# Patient Record
Sex: MALE | Race: WHITE | Employment: FULL TIME | URBAN - METROPOLITAN AREA
[De-identification: names, ages, dates, MRNs, and addresses within clinical notes are randomized per-mention and may not be internally consistent; named-entity substitution may affect disease eponyms.]

---

## 2022-11-07 ENCOUNTER — APPOINTMENT (OUTPATIENT)
Dept: GENERAL RADIOLOGY | Age: 57
End: 2022-11-07
Payer: COMMERCIAL

## 2022-11-07 ENCOUNTER — HOSPITAL ENCOUNTER (EMERGENCY)
Age: 57
Discharge: HOME | End: 2022-11-07
Attending: EMERGENCY MEDICINE
Payer: COMMERCIAL

## 2022-11-07 ENCOUNTER — APPOINTMENT (OUTPATIENT)
Dept: CT IMAGING | Age: 57
End: 2022-11-07
Payer: COMMERCIAL

## 2022-11-07 VITALS
TEMPERATURE: 98.7 F | WEIGHT: 280 LBS | DIASTOLIC BLOOD PRESSURE: 82 MMHG | BODY MASS INDEX: 41.47 KG/M2 | SYSTOLIC BLOOD PRESSURE: 132 MMHG | RESPIRATION RATE: 16 BRPM | HEART RATE: 64 BPM | OXYGEN SATURATION: 99 % | HEIGHT: 69 IN

## 2022-11-07 DIAGNOSIS — S09.90XA INJURY OF HEAD, INITIAL ENCOUNTER: ICD-10-CM
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
## 2022-11-07 DIAGNOSIS — S49.92XA INJURY OF LEFT SHOULDER, INITIAL ENCOUNTER: ICD-10-CM

## 2022-11-07 PROCEDURE — 70450 CT HEAD/BRAIN W/O DYE: CPT

## 2022-11-07 PROCEDURE — 71046 X-RAY EXAM CHEST 2 VIEWS: CPT

## 2022-11-07 PROCEDURE — 99284 EMERGENCY DEPT VISIT MOD MDM: CPT

## 2022-11-07 PROCEDURE — 73030 X-RAY EXAM OF SHOULDER: CPT

## 2022-11-07 RX ORDER — IBUPROFEN 800 MG/1
800 TABLET ORAL ONCE
Status: DISCONTINUED | OUTPATIENT
Start: 2022-11-07 | End: 2022-11-07 | Stop reason: HOSPADM

## 2022-11-07 ASSESSMENT — ENCOUNTER SYMPTOMS
DIARRHEA: 0
BACK PAIN: 1
CHEST TIGHTNESS: 0
EYE ITCHING: 0
NAUSEA: 0
CONSTIPATION: 0
RHINORRHEA: 0
SHORTNESS OF BREATH: 0
VOMITING: 0
FACIAL SWELLING: 1
ABDOMINAL DISTENTION: 0
EYE REDNESS: 0
WHEEZING: 0
TROUBLE SWALLOWING: 0
ABDOMINAL PAIN: 0

## 2022-11-07 ASSESSMENT — PAIN DESCRIPTION - DESCRIPTORS: DESCRIPTORS: ACHING

## 2022-11-07 ASSESSMENT — PAIN DESCRIPTION - LOCATION
LOCATION: HEAD;SHOULDER
LOCATION: SHOULDER;HEAD;BACK

## 2022-11-07 ASSESSMENT — PAIN SCALES - GENERAL
PAINLEVEL_OUTOF10: 2
PAINLEVEL_OUTOF10: 5

## 2022-11-07 ASSESSMENT — PAIN - FUNCTIONAL ASSESSMENT
PAIN_FUNCTIONAL_ASSESSMENT: 0-10
PAIN_FUNCTIONAL_ASSESSMENT: 0-10

## 2022-11-07 NOTE — ED PROVIDER NOTES
Simran Bolanos is a 65 y.o. male    Chief Complaint   Patient presents with    Mitchell Will earlier today at work, tripped over beam at work. Hit the front of his head. No LOC & no thinners. Pt stated that he \"saw stars\". Pain in the left shoulder & mid back. HPI   Simran Bolanos is a 65 y.o. male presenting to the ED for Fall Garrel Hammock earlier today at work, tripped over beam at work. Hit the front of his head. No LOC & no thinners. Pt stated that he \"saw stars\". Pain in the left shoulder & mid back.)    History comes primarily from the patient. Presents to the emergency department for a fall that happened at work today. The patient says he tripped over a piece of equipment falling face first.  His major complaint right now is his left shoulder, which is in pain, due to falling on his outstretched and attempts to brace himself in the fall. The patient has difficulty moving the arm forward but does have some limited mobility. The patient also has a bruise over his left eyebrow which is where he hit his head on the floor. Patient denies any lightheadedness, loss of consciousness, blood thinners, neurologic symptoms. He is also saying that he has some mild right back pain near the base of the scapula which feels muscular in nature. Patient's symptoms are moderate in severity, persistent and he has not attempted to relieve it with any medications. Review of Systems   Constitutional:  Negative for appetite change, fatigue and fever. HENT:  Positive for facial swelling (swelling and bruising left eyebrow). Negative for congestion, rhinorrhea and trouble swallowing. Eyes:  Negative for redness and itching. Respiratory:  Negative for chest tightness, shortness of breath and wheezing. Cardiovascular:  Negative for chest pain, palpitations and leg swelling. Gastrointestinal:  Negative for abdominal distention, abdominal pain, constipation, diarrhea, nausea and vomiting.    Genitourinary: Negative for decreased urine volume, difficulty urinating and frequency. Musculoskeletal:  Positive for arthralgias (left shoulder pain) and back pain (right paraspinous at level of base of scapula). Negative for myalgias. Neurological:  Negative for dizziness, syncope, weakness, numbness and headaches. Psychiatric/Behavioral:  Negative for agitation, behavioral problems, confusion and decreased concentration. The patient is not nervous/anxious. All other systems reviewed and are negative. Physical Exam  Vitals reviewed. Constitutional:       General: He is not in acute distress. Appearance: Normal appearance. He is not ill-appearing. HENT:      Head: Normocephalic. Comments: Swelling, bruising to left eyebrow     Right Ear: External ear normal.      Left Ear: External ear normal.      Nose: Nose normal. No rhinorrhea. Mouth/Throat:      Mouth: Mucous membranes are moist.      Pharynx: Oropharynx is clear. Eyes:      Extraocular Movements: Extraocular movements intact. Conjunctiva/sclera: Conjunctivae normal.      Pupils: Pupils are equal, round, and reactive to light. Cardiovascular:      Rate and Rhythm: Normal rate and regular rhythm. Heart sounds: Normal heart sounds. No murmur heard. Pulmonary:      Effort: Pulmonary effort is normal. No respiratory distress. Breath sounds: Normal breath sounds. Abdominal:      General: Abdomen is flat. There is no distension. Tenderness: There is no abdominal tenderness. Musculoskeletal:         General: Tenderness present. No swelling. Cervical back: Normal range of motion. No rigidity or tenderness. Comments: Minimal tenderness to right paraspinous muscles at base of scapula. Decreased range of motion of left shoulder. Forward movement is painful. Empty can test positive, seems to be rotator cuff injury   Skin:     General: Skin is warm and dry. Findings: No rash.    Neurological:      General: No focal deficit present. Mental Status: He is alert and oriented to person, place, and time. Cranial Nerves: No cranial nerve deficit. Motor: No weakness. Psychiatric:         Mood and Affect: Mood normal.         Behavior: Behavior normal.        Procedures     MDM  Patient presented to the Emergency Department for Fall Dafne Riojas earlier today at work, tripped over beam at work. Hit the front of his head. No LOC & no thinners. Pt stated that he \"saw stars\". Pain in the left shoulder & mid back.)    Since imaging including x-ray of the shoulder, chest and CT of the head are unremarkable for intracranial abnormalities, dislocations or fractures. Based on physical exam, the patient appears to have some rotator cuff injury. Patient did not require medication for pain while he was here in the emergency department but is instructed he can use over-the-counter pain medications for pain control. I discussed follow-up with orthopedics for his shoulder. Otherwise the patient is safe for discharge and be sent home for follow-up.           --------------------------------------------- PAST HISTORY ---------------------------------------------  Past Medical History:  has no past medical history on file. Past Surgical History:  has no past surgical history on file. Social History:  reports that he has never smoked. He has never used smokeless tobacco. He reports that he does not currently use alcohol. He reports that he does not use drugs. Family History: family history is not on file. The patients home medications have been reviewed. Allergies: Patient has no known allergies. -------------------------------------------------- RESULTS -------------------------------------------------  Labs:  No results found for this visit on 11/07/22. Radiology:  XR CHEST (2 VW)   Final Result   No acute process. Mild elevation of right hemidiaphragm.          XR SHOULDER LEFT (MIN 2 VIEWS)   Final Result   No acute abnormality. Mild AC joint arthrosis. CT Head W/O Contrast   Final Result   1. No intracranial hemorrhage or acute intracranial disease. 2.  Left periorbital and left frontal scalp mild soft tissue injury. No   fracture seen. 3.  Chronic pansinusitis.             ------------------------- NURSING NOTES AND VITALS REVIEWED ---------------------------  Date / Time Roomed:  11/7/2022 11:44 AM  [facility]    The nursing notes within the ED encounter and vital signs as below have been reviewed. /82   Pulse 64   Temp 98.7 °F (37.1 °C) (Oral)   Resp 16   Ht 5' 9\" (1.753 m)   Wt 280 lb (127 kg)   SpO2 99%   BMI 41.35 kg/m²   Oxygen Saturation Interpretation: Normal      ------------------------------------------ PROGRESS NOTES ------------------------------------------  10:37 AM EST  I have spoken with the patient and discussed todays results, in addition to providing specific details for the plan of care and counseling regarding the diagnosis and prognosis. Their questions are answered at this time and they are agreeable with the plan. I discussed at length with them reasons for immediate return here for re evaluation. They will followup with their primary care physician and orthopedic physician by calling their office tomorrow. --------------------------------- ADDITIONAL PROVIDER NOTES ---------------------------------  At this time the patient is without objective evidence of an acute process requiring hospitalization or inpatient management. They have remained hemodynamically stable throughout their entire ED visit and are stable for discharge with outpatient follow-up. The plan has been discussed in detail and they are aware of the specific conditions for emergent return, as well as the importance of follow-up. There are no discharge medications for this patient. Diagnosis:  1. Fall, initial encounter    2.  Injury of head, initial encounter    3. Injury of left shoulder, initial encounter        Disposition:  Patient's disposition: Discharge to home  Patient's condition is stable.          Stephany Martin MD  Resident  11/08/22 0458

## 2022-11-07 NOTE — Clinical Note
Buster Manley was seen and treated in our emergency department on 11/7/2022. He may return to work on 11/08/2022. Light duty for next week     If you have any questions or concerns, please don't hesitate to call.       Ravindre Huertas MD

## 2022-11-30 ENCOUNTER — OFFICE VISIT (OUTPATIENT)
Dept: ORTHOPEDIC SURGERY | Age: 57
End: 2022-11-30

## 2022-11-30 VITALS — BODY MASS INDEX: 41.47 KG/M2 | RESPIRATION RATE: 20 BRPM | WEIGHT: 280 LBS | HEIGHT: 69 IN

## 2022-11-30 DIAGNOSIS — M75.102 TEAR OF LEFT ROTATOR CUFF, UNSPECIFIED TEAR EXTENT, UNSPECIFIED WHETHER TRAUMATIC: Primary | ICD-10-CM

## 2022-11-30 NOTE — PROGRESS NOTES
New Shoulder Patient Visit     Referring Provider:   No referring provider defined for this encounter. CHIEF COMPLAINT:   Chief Complaint   Patient presents with    Shoulder Pain     Left shoulder pain after fall at work on 11/7        HPI:      Fuentes Pena is a 65y.o. year old male right-hand-dominant who fell at work on 11 7. 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 He has been having anterior shoulder pain with weakness since that time. He has had no issues with the shoulder prior to his fall. Pain is worse at night and with overhead activity. Range of motion makes it worse and rest makes it better. He has had no treatment up to this point. PAST MEDICAL HISTORY  History reviewed. No pertinent past medical history. PAST SURGICAL HISTORY  History reviewed. No pertinent surgical history. FAMILY HISTORY   History reviewed. No pertinent family history. SOCIAL HISTORY  Social History     Occupational History    Not on file   Tobacco Use    Smoking status: Never    Smokeless tobacco: Never   Vaping Use    Vaping Use: Never used   Substance and Sexual Activity    Alcohol use: Not Currently    Drug use: Never    Sexual activity: Yes     Partners: Female       CURRENT MEDICATIONS   No current outpatient medications on file. ALLERGIES  No Known Allergies    Controlled Substances Monitoring:        REVIEW OF SYSTEMS:     Constitutional:  Negative for weight loss, fevers, chills, fatigue  Cardiovascular: Negative for chest pain, palpitations  Pulmonary: Negative for shortness of breath, labored breathing, cough  GI: negative for abdominal pain, nausea, vomitting   MSK: per HPI  Skin: negative for rash, open wounds    All other systems reviewed and are negative           PHYSICAL EXAM     Vitals:    11/30/22 1131   Resp: 20   Weight: 280 lb (127 kg)   Height: 5' 9\" (1.753 m)       Height: 5' 9\" (1.753 m)  Weight: [unfilled]  BMI:  Body mass index is 41.35 kg/m².     General: The patient is alert and oriented x 3, appears to be stated age and in no distress. HEENT: head is normocephalic, atraumatic. EOMI. Neck: supple, trachea midline, no thyromegaly   Cardiovascular: peripheral pulses palpable. Normal Capillary refill   Respiratory: breathing unlabored, chest expansion symmetric   Skin: no rash, no open wounds, no erythema  Psych: normal affect; mood stable  Neurologic: gait normal, sensation grossly intact in extremities  MSK:    Cervical Spine: There is no tenderness to palpation along the cervical spine. Range of motion is normal.  Spurling's is negative    Shoulder Exam:   Left shoulder: Tender palpation anterior along his biceps tendon. Is atraumatic with skin intact. He has painful range of motion but it is full in forward flexion internal and external rotation and abduction. He has 3-5 strength with resisted abduction and external rotation. 5 out of 5 strength with internal rotation. Positive Neer's sign. Negative drop arm. Negative empty can. Positive Yergason's. Negative belly press. IMAGING:     XRAY:  3 views of the left shoulder show were reviewed demonstrate    Radiographic findings reviewed with patient    ASSESSMENT   Left shoulder pain, probable rotator cuff tear      PLAN  Plan discussed in detail with the patient. He examines like he has a rotator cuff tear with weakness. For this reason I am going to request an MRI from his Worker's [employer]. We will see him back after the MRI to discuss treatment plan moving forward. He understands and wishes to proceed. He needs no restrictions at work for now.   He can continue to do his activities as tolerated          Clovisnancy Wray DO  Orthopaedic Surgery   11/30/22   11:42 AM EST

## 2022-12-05 ENCOUNTER — TELEPHONE (OUTPATIENT)
Dept: ORTHOPEDIC SURGERY | Age: 57
End: 2022-12-05

## 2022-12-05 DIAGNOSIS — M75.102 TEAR OF LEFT ROTATOR CUFF, UNSPECIFIED TEAR EXTENT, UNSPECIFIED WHETHER TRAUMATIC: Primary | ICD-10-CM

## 2022-12-05 NOTE — TELEPHONE ENCOUNTER
Received authorization from [facility] for Left shoulder MRI w/o contrast. Valid 11/30/2022 - 12/30/2022. Order pended in [facility].

## 2023-01-03 ENCOUNTER — HOSPITAL ENCOUNTER (OUTPATIENT)
Dept: MRI IMAGING | Age: 58
Discharge: HOME | End: 2023-01-05
Payer: COMMERCIAL

## 2023-01-03 DIAGNOSIS — M75.102 TEAR OF LEFT ROTATOR CUFF, UNSPECIFIED TEAR EXTENT, UNSPECIFIED WHETHER TRAUMATIC: ICD-10-CM

## 2023-01-03 PROCEDURE — 73221 MRI JOINT UPR EXTREM W/O DYE: CPT

## 2023-01-18 ENCOUNTER — OFFICE VISIT (OUTPATIENT)
Dept: ORTHOPEDIC SURGERY | Age: 58
End: 2023-01-18

## 2023-01-18 VITALS — HEIGHT: 69 IN | BODY MASS INDEX: 46.65 KG/M2 | WEIGHT: 315 LBS

## 2023-01-18 DIAGNOSIS — S42.255D CLOSED NONDISPLACED FRACTURE OF GREATER TUBEROSITY OF LEFT HUMERUS WITH ROUTINE HEALING, SUBSEQUENT ENCOUNTER: Primary | ICD-10-CM

## 2023-01-18 DIAGNOSIS — M75.102 TEAR OF LEFT ROTATOR CUFF, UNSPECIFIED TEAR EXTENT, UNSPECIFIED WHETHER TRAUMATIC: ICD-10-CM

## 2023-01-18 NOTE — PROGRESS NOTES
Referring Provider:   No referring provider defined for this encounter. CHIEF COMPLAINT:   Chief Complaint   Patient presents with    Shoulder Injury     WC FU after MRI Left shoulder        HPI:      Papi Cunningham is a 65y.o. year old male right-hand-dominant who fell at work on 11/7. 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 At his last visit I ordered an MRI and is here today to discuss the results. His pain is greatly improved. He still has some soreness with activity but he has returned to work without restriction. He gets soreness going down the anterior side of his arm and under his axilla. Denies fever chills. Denies numbness tingling. PAST MEDICAL HISTORY  No past medical history on file. PAST SURGICAL HISTORY  No past surgical history on file. FAMILY HISTORY   No family history on file. SOCIAL HISTORY  Social History     Occupational History    Not on file   Tobacco Use    Smoking status: Never    Smokeless tobacco: Never   Vaping Use    Vaping Use: Never used   Substance and Sexual Activity    Alcohol use: Not Currently    Drug use: Never    Sexual activity: Yes     Partners: Female       CURRENT MEDICATIONS   No current outpatient medications on file. ALLERGIES  No Known Allergies    Controlled Substances Monitoring:        REVIEW OF SYSTEMS:     Constitutional:  Negative for weight loss, fevers, chills, fatigue  Cardiovascular: Negative for chest pain, palpitations  Pulmonary: Negative for shortness of breath, labored breathing, cough  GI: negative for abdominal pain, nausea, vomitting   MSK: per HPI  Skin: negative for rash, open wounds    All other systems reviewed and are negative           PHYSICAL EXAM     Vitals:    01/18/23 1542   Weight: (!) 315 lb (142.9 kg)   Height: 5' 9\" (1.753 m)       Height: 5' 9\" (1.753 m)  Weight: [unfilled]  BMI:  Body mass index is 46.52 kg/m². General: The patient is alert and oriented x 3, appears to be stated age and in no distress. HEENT: head is normocephalic, atraumatic. EOMI. Neck: supple, trachea midline, no thyromegaly   Cardiovascular: peripheral pulses palpable. Normal Capillary refill   Respiratory: breathing unlabored, chest expansion symmetric   Skin: no rash, no open wounds, no erythema  Psych: normal affect; mood stable  Neurologic: gait normal, sensation grossly intact in extremities  MSK:    Cervical Spine: There is no tenderness to palpation along the cervical spine. Range of motion is normal.  Spurling's is negative    Shoulder Exam:   Left shoulder: Mildly tender to palpation over his lateral shoulder. His biceps tendon is nontender. He has 5 out of 5 strength with resisted rotator cuff muscle testing in abduction forward flexion internal and external rotation. .  5 out of 5 strength with internal rotation. Negative drop arm. Negative empty can. Positive Yergason's. Negative belly press. He has full range of motion with forward flexion abduction internal/external rotation    IMAGING:     MRI was reviewed in detail independently by myself and discussed with the patient. MRI showed a nondisplaced edema within his greater tuberosity consistent with a nondisplaced fracture. Does have some rotator cuff tendinitis but no discrete tearing. He    Radiographic findings reviewed with patient    ASSESSMENT   Left shoulder pain  Nondisplaced greater tuberosity fracture, healing      PLAN  -His imaging results were explained in detail. I think he is starting to heal this fracture. His pain is greatly improved and he is returned to work. He is not having any weakness. This is not going to require any surgical intervention. If he develops increasing shoulder pain or weakness down the road he can come back in and be seen. Otherwise he can continue to do what he is doing. I did offer him therapy but he is not interested. He can follow-up on an as-needed basis.   He is happy with his results        Alvaro Mendez DO Orthopaedic Surgery   1/18/23  3:51 PM